# Patient Record
Sex: FEMALE | Race: WHITE | NOT HISPANIC OR LATINO | URBAN - METROPOLITAN AREA
[De-identification: names, ages, dates, MRNs, and addresses within clinical notes are randomized per-mention and may not be internally consistent; named-entity substitution may affect disease eponyms.]

---

## 2017-09-06 ENCOUNTER — OUTPATIENT (OUTPATIENT)
Dept: OUTPATIENT SERVICES | Facility: HOSPITAL | Age: 82
LOS: 1 days | End: 2017-09-06
Payer: MEDICARE

## 2017-09-06 ENCOUNTER — APPOINTMENT (OUTPATIENT)
Dept: ULTRASOUND IMAGING | Facility: HOSPITAL | Age: 82
End: 2017-09-06
Payer: MEDICARE

## 2017-09-06 PROBLEM — Z00.00 ENCOUNTER FOR PREVENTIVE HEALTH EXAMINATION: Status: ACTIVE | Noted: 2017-09-06

## 2017-09-06 PROCEDURE — 76770 US EXAM ABDO BACK WALL COMP: CPT | Mod: 26

## 2017-09-06 PROCEDURE — 76770 US EXAM ABDO BACK WALL COMP: CPT

## 2019-04-25 ENCOUNTER — APPOINTMENT (OUTPATIENT)
Dept: ORTHOPEDIC SURGERY | Facility: CLINIC | Age: 84
End: 2019-04-25
Payer: MEDICARE

## 2019-04-25 PROCEDURE — 99214 OFFICE O/P EST MOD 30 MIN: CPT | Mod: 25

## 2019-04-25 PROCEDURE — 20610 DRAIN/INJ JOINT/BURSA W/O US: CPT | Mod: LT

## 2019-04-25 NOTE — PHYSICAL EXAM
[Normal RLE] : Right Lower Extremity: No scars, rashes, lesions, ulcers, skin intact [Normal Touch] : sensation intact for touch [Normal LLE] : Left Lower Extremity: No scars, rashes, lesions, ulcers, skin intact [Normal] : No swelling, no edema, normal pedal pulses and normal temperature [Acute Distress] : not in acute distress [Poor Appearance] : well-appearing [Obese] : not obese [de-identified] : Left Lower Extremity\par o Knee :\par ¦ Inspection/Palpation : no tenderness to palpation along the joint lines, no swelling, no deformity \par ¦ Range of Motion : 0 - 110 degrees, no crepitus\par ¦ Stability : no valgus or varus instability present on provocative testing, Lachman’s Test (-)\par ¦ Strength : flexion and extension 3/5 (compared to 5/5 on the right)\par o Muscle Bulk : normal muscle bulk present\par o Skin : no erythema, no ecchymosis \par o Sensation : sensation to pin intact\par o Vascular Exam : no edema, no cyanosis, dorsalis pedis artery pulse 2+, posterior tibial artery pulse 2+  [de-identified] : o XRays of the left knee taken in the office on 12/3/2018, revealed:\par There are no soft tissue abnormalities, no fractures, alignment is normal, mild to moderate medial compartment osteoarthritis, normal bone density, no bony lesions.

## 2019-04-25 NOTE — END OF VISIT
[FreeTextEntry3] : All medical record entries made by the Nilsonibvioletta were at my, Dr. Raheem Benavidez, direction and personally dictated by me on 04/25/2019. I have reviewed the chart and agree that the record accurately reflects my personal performance of the history, physical exam, assessment and plan. I have also personally directed, reviewed, and agreed with the chart.

## 2019-04-25 NOTE — PROCEDURE
[de-identified] : At this point I recommended a therapeutic injection and under sterile precautions an injection of 4 cc of Monovisc (Lot: [id number], Expiration: 11/2021), was placed into the joint of the Left knee without complication.

## 2019-04-25 NOTE — ADDENDUM
[FreeTextEntry1] : I, Temi Kearns, acted solely as a scribe for Dr. Raheem Benavidez on this date 04/25/2019.

## 2019-04-25 NOTE — HISTORY OF PRESENT ILLNESS
[de-identified] : 83 year old female presents for an evaluation of chronic left knee pain, shes been diagnosed with mild osteoarthritis of her knee. She was being followed by Dr. Mckeon who treated her with corticosteroid injections to help alleviated her symptoms, her last injection was on 1/23/2018. Today she rates her pain a 5/10 and describes it as a dull aching pain located diffusely about her knee that is exacerbated with prolonged periods of walking. She is accompanied to the office by her  who is contributing to her medical history. She has no other complaints at this time.

## 2019-04-25 NOTE — DISCUSSION/SUMMARY
[de-identified] : The underlying pathophysiology was reviewed in great detail with the patient as well as the various treatment options, including ice, analgesics, NSAIDs, Physical therapy, steroid injections.\par \par The patient wishes to proceed with a MONOVISC injection of the left knee. \par \par A home exercise sheet was given and discussed with the patient to follow. \par \par FU PRN.

## 2019-11-21 ENCOUNTER — APPOINTMENT (OUTPATIENT)
Dept: ORTHOPEDIC SURGERY | Facility: CLINIC | Age: 84
End: 2019-11-21
Payer: MEDICARE

## 2019-11-21 VITALS
DIASTOLIC BLOOD PRESSURE: 83 MMHG | SYSTOLIC BLOOD PRESSURE: 135 MMHG | WEIGHT: 150 LBS | HEIGHT: 63 IN | HEART RATE: 66 BPM | BODY MASS INDEX: 26.58 KG/M2

## 2019-11-21 PROCEDURE — 20610 DRAIN/INJ JOINT/BURSA W/O US: CPT | Mod: LT

## 2019-11-21 PROCEDURE — 99213 OFFICE O/P EST LOW 20 MIN: CPT | Mod: 25

## 2019-11-21 NOTE — DISCUSSION/SUMMARY
[de-identified] : The underlying pathophysiology was reviewed in great detail with the patient as well as the various treatment options, including ice, analgesics, NSAIDs, Physical therapy, steroid injections.\par \par The patient wishes to proceed with a MONOVISC injection of the left knee. \par \par FU PRN.

## 2019-11-21 NOTE — PHYSICAL EXAM
[Normal RLE] : Right Lower Extremity: No scars, rashes, lesions, ulcers, skin intact [Normal LLE] : Left Lower Extremity: No scars, rashes, lesions, ulcers, skin intact [Normal Touch] : sensation intact for touch [Normal] : No swelling, no edema, normal pedal pulses and normal temperature [Poor Appearance] : well-appearing [Acute Distress] : not in acute distress [Obese] : not obese [de-identified] : Left Lower Extremity\par o Knee :\par ¦ Inspection/Palpation : no tenderness to palpation along the joint lines, no swelling, no deformity \par ¦ Range of Motion : 0 - 110 degrees, no crepitus\par ¦ Stability : no valgus or varus instability present on provocative testing, Lachman’s Test (-)\par ¦ Strength : flexion and extension 3/5 (compared to 5/5 on the right)\par o Muscle Bulk : normal muscle bulk present\par o Skin : no erythema, no ecchymosis \par o Sensation : sensation to pin intact\par o Vascular Exam : no edema, no cyanosis, dorsalis pedis artery pulse 2+, posterior tibial artery pulse 2+

## 2019-11-21 NOTE — END OF VISIT
[FreeTextEntry3] : All medical record entries made by the Scribe were at my, Dr. Raheem Benavidez, direction and personally dictated by me on 11/21/2019. I have reviewed the chart and agree that the record accurately reflects my personal performance of the history, physical exam, assessment and plan. I have also personally directed, reviewed, and agreed with the chart.

## 2019-11-21 NOTE — ADDENDUM
[FreeTextEntry1] : I, Lauren Sinha, acted solely as a scribe for Dr. Raheem Benavidez on this date 11/21/2019.

## 2019-11-21 NOTE — PROCEDURE
[de-identified] : At this point I recommended a therapeutic injection and under sterile precautions an injection of 4 cc of Monovisc (Lot: [id number], Expiration: 04/2022), was placed into the joint of the Left knee without complication.

## 2019-11-21 NOTE — HISTORY OF PRESENT ILLNESS
[de-identified] : 84 year old female presents for an evaluation of chronic left knee pain, she has been diagnosed with mild to moderate medial compartment osteoarthritis of her left knee. At her last visit on 4/25/2019 she received a Monovisc injection of the left knee which greatly alleviated her symptoms. Her pain has since returned and she describes a dull aching pain located diffusely about her knee that is exacerbated with prolonged periods of walking. She has been utilizing CBD oil to manage her pain at this time.

## 2020-04-24 ENCOUNTER — APPOINTMENT (OUTPATIENT)
Dept: ORTHOPEDIC SURGERY | Facility: CLINIC | Age: 85
End: 2020-04-24
Payer: MEDICARE

## 2020-04-24 PROCEDURE — 99213 OFFICE O/P EST LOW 20 MIN: CPT | Mod: 95

## 2020-04-24 NOTE — HISTORY OF PRESENT ILLNESS
[Other Location: e.g. Home (Enter Location, City,State)___] : at [unfilled] [Home] : at home, [unfilled] , at the time of the visit. [Self] : self [Patient] : the patient [Spouse] : spouse [de-identified] : 84 year old female presents for an evaluation of chronic left knee pain, she has been diagnosed with mild to moderate medial compartment osteoarthritis of her left knee. At her last visit on 4/25/2019 she received a Monovisc injection of the left knee which greatly alleviated her symptoms. Her pain has since returned and she describes a dull aching pain located diffusely about her knee that is exacerbated with prolonged periods of walking. She has been utilizing CBD oil to manage her pain at this time.  She states that the Monovisc injection was very helpful but that it has begun to wear off.

## 2020-04-24 NOTE — DISCUSSION/SUMMARY
[de-identified] : The underlying pathophysiology was reviewed in great detail with the patient as well as the various treatment options, including ice, analgesics, NSAIDs, Physical therapy, steroid injections.\par \par The patient wishes to proceed with a MONOVISC injection of the left knee. \par \par FU in one month when she becomes eligible for repeat injection.

## 2020-04-24 NOTE — PHYSICAL EXAM
[Normal LLE] : Left Lower Extremity: No scars, rashes, lesions, ulcers, skin intact [Normal Touch] : sensation intact for touch [Normal RLE] : Right Lower Extremity: No scars, rashes, lesions, ulcers, skin intact [Normal] : No swelling, no edema, normal pedal pulses and normal temperature [Poor Appearance] : well-appearing [Obese] : not obese [de-identified] : Well developed well nourished patient in no acute distress. \par Alert and oriented x 3.\par Normal mood and affect. \par Skin without rashes. \par Breathing non-labored. \par Normal balance, normal gait\par \par Left Lower Extremity\par o Knee :\par ¦ Inspection/Palpation : pt localizes tenderness to the medial knee, no swelling, no deformity \par ¦ Range of Motion : 0 - 110 degrees, no crepitus\par o Muscle Bulk : normal muscle bulk present\par o Skin : no erythema, no ecchymosis \par o Sensation : sensation intact\par o Vascular Exam : no edema, no cyanosis [Acute Distress] : not in acute distress

## 2020-05-18 ENCOUNTER — APPOINTMENT (OUTPATIENT)
Dept: ORTHOPEDIC SURGERY | Facility: CLINIC | Age: 85
End: 2020-05-18
Payer: MEDICARE

## 2020-05-18 DIAGNOSIS — S52.90XA UNSPECIFIED FRACTURE OF UNSPECIFIED FOREARM, INITIAL ENCOUNTER FOR CLOSED FRACTURE: ICD-10-CM

## 2020-05-18 DIAGNOSIS — S59.109G: ICD-10-CM

## 2020-05-18 PROCEDURE — 99214 OFFICE O/P EST MOD 30 MIN: CPT | Mod: 25

## 2020-05-18 PROCEDURE — 73110 X-RAY EXAM OF WRIST: CPT | Mod: LT

## 2020-05-18 PROCEDURE — 20605 DRAIN/INJ JOINT/BURSA W/O US: CPT | Mod: LT

## 2020-05-18 PROCEDURE — 29105 APPLICATION LONG ARM SPLINT: CPT | Mod: 59,LT

## 2020-05-18 NOTE — DISCUSSION/SUMMARY
[de-identified] : The underlying pathophysiology was reviewed in great detail with the patient as well as the various treatment options, including ice, analgesics, NSAIDs, Physical therapy, steroid injections, cast, splint and surgical intervention.\par \par Fracture was reduced and Sugar tong splint was applied today.\par \par I explained that in the current reduction she will have some persistent deformity of the wrist.  She does not want to pursue surgery at this time and is accepting of the deformity.\par \par FU in 1 week for repeat xrays and cast placement.

## 2020-05-18 NOTE — PHYSICAL EXAM
[de-identified] : Left Upper Extremity\par o Wrist:\par ¦ Inspection/Palpation :diffuse tenderness, moderate swelling, no gross deformities\par ¦ Range of Motion : limited and painful in all planes, no crepitus\par ¦ Strength : not assessed today due to pain\par ¦ Stability : no joint instability on provocative testing\par ¦ Tests/Signs : Tinel's sign (-) over carpal tunnel\par o Muscle Bulk : no atrophy\par o Sensation : sensation intact to light touch\par o Skin : no skin lesions or discoloration\par o Vascular Exam : no edema or cyanosis, radial and ulnar pulses normal [de-identified] : o Left Wrist: AP, lateral and oblique views of the wrist were obtained, there are no soft tissue abnormalities, transverse fracture of distal radius metaphysis with shortening and slight dorsal angulation, normal appearing joint spaces, normal bone density, no bony lesions.\par \par o Left Wrist Post Reduction: AP, lateral and oblique views of the wrist were obtained, there are no soft tissue abnormalities, transverse fracture of distal radius fracture with continued shortening and improved dorsal angulation, alignment is normal, normal appearing joint spaces, normal bone density, no bony lesions.\par \par \par \par \par

## 2020-05-18 NOTE — HISTORY OF PRESENT ILLNESS
[de-identified] : MARCE LÓPEZ is a 84 year female presenting to the office complaining of left wrist pain . She  presents to the office immobilized in a soft brace. Patient reports pain since yesterday when she fell out of bed landing on her left wrist. The patient describes the pain as a dull aching, and occasionally sharp pain diffusely located in her wrist that is intermittent in nature. She reports some swelling and bruising of the wrist. Her  symptoms are exacerbated with any movement of the wrist or hand. Patient reports the pain did not wake her up last night. Patient is taking NSAIDs for pain relief with good relief in symptoms. Patient denies any other complaints at this time.\par

## 2020-05-18 NOTE — PROCEDURE
[de-identified] : ¦ Local anesthetic of 8 cc of 1% Lidocaine was injected to the ventral aspect of the left wrist. \par ¦ Distal radius fracture was reduced and placed in a sugar tong splint. \par ¦ Neurovascular status was assessed pre and postreduction and placement of the sugar tong splint. She was neurovascularly intact.

## 2020-05-28 ENCOUNTER — APPOINTMENT (OUTPATIENT)
Dept: ORTHOPEDIC SURGERY | Facility: CLINIC | Age: 85
End: 2020-05-28
Payer: MEDICARE

## 2020-05-28 PROCEDURE — 73110 X-RAY EXAM OF WRIST: CPT | Mod: LT

## 2020-05-28 PROCEDURE — 29075 APPL CST ELBW FNGR SHORT ARM: CPT | Mod: LT

## 2020-05-28 PROCEDURE — 99214 OFFICE O/P EST MOD 30 MIN: CPT | Mod: 25

## 2020-05-28 NOTE — PROCEDURE
[de-identified] : b Short arm cast placement left arm. \par ¦ Distal radius fracture was placed in a short arm cast\par ¦ Neurovascular status was assessed pre and postreduction and placement of the short arm cast. She was neurovascularly intact.

## 2020-05-28 NOTE — PHYSICAL EXAM
[de-identified] : Left Upper Extremity\par o Wrist:\par ¦ Inspection/Palpation : splint in place which was removed, diffuse tenderness, moderate swelling, mild radial deviation deformity\par ¦ Range of Motion : limited and painful in all planes, no crepitus\par ¦ Strength : not assessed today due to pain\par ¦ Stability : no joint instability on provocative testing\par ¦ Tests/Signs : Tinel's sign (-) over carpal tunnel\par o Muscle Bulk : no atrophy\par o Sensation : sensation intact to light touch\par o Skin : no skin lesions or discoloration\par o Vascular Exam : no edema or cyanosis, radial and ulnar pulses normal [de-identified] : o Left Wrist: AP, lateral and oblique views of the wrist were obtained, there are no soft tissue abnormalities, transverse fracture of distal radius metaphysis with shortening and loss of radial inclination, positive ulnar variance, normal saggital alignment, normal appearing joint spaces, normal bone density, no bony lesions.\par \par \par \par \par \par

## 2020-05-28 NOTE — DISCUSSION/SUMMARY
[de-identified] : The underlying pathophysiology was reviewed in great detail with the patient as well as the various treatment options, including ice, analgesics, NSAIDs, Physical therapy, steroid injections, cast, splint and surgical intervention.\par \par A short arm cast was applied today.\par \par I explained that in the current reduction she will have some persistent deformity of the wrist.  She does not want to pursue surgery at this time and is accepting of the deformity. I use models to aid in my explanation.\par \par FU in 2 week for repeat xray

## 2020-05-28 NOTE — HISTORY OF PRESENT ILLNESS
[de-identified] : MARCE LÓPEZ is a 84 year female presenting to the office complaining of left wrist pain. She  presents to the office immobilized in a soft brace. Patient reports pain began on 05/18/2020 when she fell out of bed landing on her left wrist. The patient describes the pain as a dull aching, and occasionally sharp pain diffusely located in her wrist that is intermittent in nature. She reports some swelling and bruising of the wrist. Her symptoms are exacerbated with any movement of the wrist or hand. Patient reports the pain does not wake her up at night. Patient is taking NSAIDs for pain relief with good relief in symptoms. She presents today for repeat xrays and cast placement. Patient denies any other complaints at this time.\par

## 2020-06-08 ENCOUNTER — APPOINTMENT (OUTPATIENT)
Dept: ORTHOPEDIC SURGERY | Facility: CLINIC | Age: 85
End: 2020-06-08
Payer: MEDICARE

## 2020-06-08 PROCEDURE — 20610 DRAIN/INJ JOINT/BURSA W/O US: CPT | Mod: LT

## 2020-06-08 PROCEDURE — 73110 X-RAY EXAM OF WRIST: CPT | Mod: LT

## 2020-06-08 PROCEDURE — 99214 OFFICE O/P EST MOD 30 MIN: CPT | Mod: 25

## 2020-06-08 NOTE — DISCUSSION/SUMMARY
[de-identified] : The underlying pathophysiology was reviewed in great detail with the patient as well as the various treatment options, including ice, analgesics, NSAIDs, Physical therapy, steroid injections, cast, splint and surgical intervention.\par \par Patient received a Durolane injection of the left knee today. \par \par She may return in 6 months for another series of gel injections as per insurance guidelines. A cortisone injection may be administered during the interim period if she cannot tolerate such a wait.\par \par Remain in cast for 3 more weeks. \par \par I explained that in the current reduction she will have some persistent deformity of the wrist.  She does not want to pursue surgery at this time and is accepting of the deformity. I use models to aid in my explanation.\par \par FU in 3 week for repeat xray and cast removal.\par \par

## 2020-06-08 NOTE — PROCEDURE
[de-identified] : At this point I recommended a therapeutic injection and under sterile precautions an injection of 3 cc of Durolane (Lot: [id number] , Expiration: 11/20222 ), was placed into the joint of the Left knee without complication.\par \par \par

## 2020-06-08 NOTE — HISTORY OF PRESENT ILLNESS
[de-identified] : MARCE LÓPEZ is a 84 year female presenting to the office complaining of left wrist pain. She  presents to the office immobilized in a cast. Patient reports pain began on 05/18/2020 when she fell out of bed landing on her left wrist. The patient describes the pain as a dull aching, and occasionally sharp pain diffusely located in her wrist that is intermittent in nature. Patient is taking NSAIDs for pain relief with good relief in symptoms. She presents today for repeat xrays. \par \par She also presents complaining of chronic left knee pain, she has been diagnosed with mild to moderate medial compartment osteoarthritis of her left knee. At her last visit on 11/21/2019 she received a Monovisc injection of the left knee which greatly alleviated her symptoms. Her pain has since returned and she describes a dull aching pain located diffusely about her knee that is exacerbated with prolonged periods of walking. She has been utilizing CBD oil to manage her pain at this time. She states that the Monovisc injection was very helpful but that it has begun to wear off. She would like to proceed with another gel injection today. \par Patient denies any other complaints at this time.

## 2020-06-08 NOTE — PHYSICAL EXAM
[de-identified] : Left Upper Extremity\par o Wrist:\par ¦ Inspection/Palpation : cast in place, clean, dry and intact, not assessed due to cast placement. \par ¦ Range of Motion : not assessed due to cast placement. \par ¦ Strength : not assessed today due to pain\par ¦ Stability : no joint instability on provocative testing\par ¦ Tests/Signs : not assessed due to cast placement. \par o Muscle Bulk : no atrophy\par o Sensation : not assessed due to cast placement. \par o Vascular Exam :not assessed due to cast placement. \par \par Left Lower Extremity\par o Knee :\par ¦ Inspection/Palpation : no tenderness to palpation along the joint lines, no swelling, no deformity \par ¦ Range of Motion : 0 - 110 degrees, no crepitus\par ¦ Stability : no valgus or varus instability present on provocative testing, Lachman’s Test (-)\par ¦ Strength : flexion and extension 3/5 (compared to 5/5 on the right)\par o Muscle Bulk : normal muscle bulk present\par o Skin : no erythema, no ecchymosis \par o Sensation : sensation to pin intact\par o Vascular Exam : no edema, no cyanosis, dorsalis pedis artery pulse 2+, posterior tibial artery pulse 2+ \par Musculoskeletal: Gait: normal. \par  [de-identified] : o Left Wrist: AP, lateral and oblique views of the wrist were obtained, there are no soft tissue abnormalities, transverse fracture of distal radius metaphysis with shortening and loss of radial inclination, positive ulnar variance, normal sagittal alignment, callus formation present, normal appearing joint spaces, normal bone density, no bony lesions.\par \par \par \par \par \par

## 2020-06-29 ENCOUNTER — APPOINTMENT (OUTPATIENT)
Dept: ORTHOPEDIC SURGERY | Facility: CLINIC | Age: 85
End: 2020-06-29
Payer: MEDICARE

## 2020-06-29 VITALS — WEIGHT: 150 LBS | HEIGHT: 63 IN | BODY MASS INDEX: 26.58 KG/M2

## 2020-06-29 PROCEDURE — 99213 OFFICE O/P EST LOW 20 MIN: CPT

## 2020-06-29 PROCEDURE — 73110 X-RAY EXAM OF WRIST: CPT | Mod: LT

## 2020-06-29 NOTE — HISTORY OF PRESENT ILLNESS
[de-identified] : MARCE LÓPEZ is a 84 year female presenting to the office complaining of left wrist pain. She  presents to the office immobilized in a cast. Patient reports pain began on 05/18/2020 when she fell out of bed landing on her left wrist. The patient describes the pain as a dull aching, and occasionally sharp pain diffusely located in her wrist that is intermittent in nature. Patient is taking NSAIDs for pain relief with good relief in symptoms. She presents today for repeat xrays. \par \par She also presents complaining of chronic left knee pain, she has been diagnosed with mild to moderate medial compartment osteoarthritis of her left knee. At her last visit on 11/21/2019 she received a Monovisc injection of the left knee which greatly alleviated her symptoms. Her pain has since returned and she describes a dull aching pain located diffusely about her knee that is exacerbated with prolonged periods of walking. She has been utilizing CBD oil to manage her pain at this time. She states that the Monovisc injection was very helpful but that it has begun to wear off. She would like to proceed with another gel injection today. \par Patient denies any other complaints at this time.

## 2020-06-29 NOTE — DISCUSSION/SUMMARY
[de-identified] : The underlying pathophysiology was reviewed in great detail with the patient as well as the various treatment options, including ice, analgesics, NSAIDs, Physical therapy, steroid injections, cast, splint and surgical intervention.\par \par Cast was removed and patient was placed in a wrist brace today. Continue use as needed for pain control and support. \par \par A home exercise sheet was given and discussed with the patient to follow.\par \par I explained that in the current reduction she will have some persistent deformity of the wrist.  She does not want to pursue surgery at this time and is accepting of the deformity. I use models to aid in my explanation.\par \par FU in 4 week for repeat xray \par \par

## 2020-06-29 NOTE — PHYSICAL EXAM
[de-identified] : Left Upper Extremity\par o Wrist:\par ¦ Inspection/Palpation : distal radius tenderness to palpation. Mild diffuse swelling. No deformities.\par ¦ Range of Motion : moderately restricted motion in all planes with mild pain.\par ¦ Strength : not assessed today due to pain\par ¦ Stability : no joint instability on provocative testing\par ¦ Tests/Signs : not assessed due to cast placement. \par o Muscle Bulk : no atrophy\par o Sensation : not assessed due to cast placement. \par o Vascular Exam :not assessed due to cast placement. \par \par Left Lower Extremity\par o Knee :\par ¦ Inspection/Palpation : no tenderness to palpation along the joint lines, no swelling, no deformity \par ¦ Range of Motion : 0 - 110 degrees, no crepitus\par ¦ Stability : no valgus or varus instability present on provocative testing, Lachman’s Test (-)\par ¦ Strength : flexion and extension 3/5 (compared to 5/5 on the right)\par o Muscle Bulk : normal muscle bulk present\par o Skin : no erythema, no ecchymosis \par o Sensation : sensation to pin intact\par o Vascular Exam : no edema, no cyanosis, dorsalis pedis artery pulse 2+, posterior tibial artery pulse 2+ \par Musculoskeletal: Gait: normal. \par  [de-identified] : o Left Wrist: AP, lateral and oblique views of the wrist were obtained, there are no soft tissue abnormalities, transverse fracture of distal radius metaphysis with shortening and loss of radial inclination, positive ulnar variance, normal sagittal alignment, callus formation present, normal appearing joint spaces, normal bone density, no bony lesions.\par \par \par \par \par \par

## 2020-07-30 ENCOUNTER — APPOINTMENT (OUTPATIENT)
Dept: ORTHOPEDIC SURGERY | Facility: CLINIC | Age: 85
End: 2020-07-30
Payer: MEDICARE

## 2020-07-30 DIAGNOSIS — S52.532D COLLES' FRACTURE OF LEFT RADIUS, SUBSEQUENT ENCOUNTER FOR CLOSED FRACTURE WITH ROUTINE HEALING: ICD-10-CM

## 2020-07-30 PROCEDURE — 73110 X-RAY EXAM OF WRIST: CPT | Mod: LT

## 2020-07-30 PROCEDURE — 99213 OFFICE O/P EST LOW 20 MIN: CPT

## 2020-07-30 NOTE — HISTORY OF PRESENT ILLNESS
[de-identified] : MARCE LÓPEZ is a 84 year female presenting to the office complaining of left wrist pain. She  presents to the office wearing a splint as needed. Patient reports pain began on 05/18/2020 when she fell out of bed landing on her left wrist. The patient describes the pain as a dull aching, and occasionally sharp pain diffusely located in her wrist that is intermittent in nature. She has been performing a home exercise program noting improvements in strength and range of motion. Patient is taking NSAIDs for pain relief with good relief in symptoms. She presents today for repeat xrays. \par \par She also presents complaining of chronic left knee pain, she has been diagnosed with mild to moderate medial compartment osteoarthritis of her left knee. At her last visit on 06/08/2020 she received a Durolane injection of the left knee which greatly alleviated her symptoms. Her pain has since minimally returned and she describes a dull aching pain located diffusely about her knee that is exacerbated with prolonged periods of walking. She has been utilizing CBD oil to manage her pain at this time. \par Patient denies any other complaints at this time.

## 2020-07-30 NOTE — PHYSICAL EXAM
[de-identified] : Left Upper Extremity\par o Wrist:\par ¦ Inspection/Palpation : no distal radius tenderness to palpation. minimal diffuse swelling. radial shortening deformity.\par ¦ Range of Motion : flexion restricted at 45 degrees, full extension of the wrist\par ¦ Strength :Flexion/Extension 5/5\par ¦ Stability : no joint instability on provocative testing\par ¦ Tests/Signs : not assessed due to cast placement. \par o Muscle Bulk : no atrophy\par o Sensation : not assessed due to cast placement. \par o Vascular Exam :not assessed due to cast placement. \par \par Left Lower Extremity\par o Knee :\par ¦ Inspection/Palpation : no tenderness to palpation along the joint lines, no swelling, no deformity \par ¦ Range of Motion : 0 - 110 degrees, no crepitus\par ¦ Stability : no valgus or varus instability present on provocative testing, Lachman’s Test (-)\par ¦ Strength : flexion and extension 3/5 (compared to 5/5 on the right)\par o Muscle Bulk : normal muscle bulk present\par o Skin : no erythema, no ecchymosis \par o Sensation : sensation to pin intact\par o Vascular Exam : no edema, no cyanosis, dorsalis pedis artery pulse 2+, posterior tibial artery pulse 2+ \par Musculoskeletal: Gait: normal. \par  [de-identified] : o Left Wrist: AP, lateral and oblique views of the wrist were obtained, there are no soft tissue abnormalities, transverse fracture of distal radius metaphysis with shortening and loss of radial inclination, positive ulnar variance, normal sagittal alignment, increased callus formation present, normal appearing joint spaces, normal bone density, no bony lesions.\par \par \par \par \par \par

## 2020-07-30 NOTE — DISCUSSION/SUMMARY
[de-identified] : The underlying pathophysiology was reviewed in great detail with the patient as well as the various treatment options, including ice, analgesics, NSAIDs, Physical therapy, steroid injections, cast, splint and surgical intervention.\par \par  Continue use of wrist brace as needed for pain control and support. \par \par Continue home exercise program \par \par I explained that in the current reduction she will have some persistent deformity of the wrist.  She does not want to pursue surgery at this time and is accepting of the deformity. I use models to aid in my explanation.\par \par FU 8 weeks or prn for the wrist. \par \par

## 2020-08-24 ENCOUNTER — APPOINTMENT (OUTPATIENT)
Dept: ORTHOPEDIC SURGERY | Facility: CLINIC | Age: 85
End: 2020-08-24
Payer: MEDICARE

## 2020-08-24 PROCEDURE — 20610 DRAIN/INJ JOINT/BURSA W/O US: CPT | Mod: RT

## 2020-08-24 PROCEDURE — 99214 OFFICE O/P EST MOD 30 MIN: CPT | Mod: 25

## 2020-08-24 PROCEDURE — 73564 X-RAY EXAM KNEE 4 OR MORE: CPT | Mod: RT

## 2020-08-24 NOTE — PHYSICAL EXAM
[Normal RLE] : Right Lower Extremity: No scars, rashes, lesions, ulcers, skin intact [Normal LLE] : Left Lower Extremity: No scars, rashes, lesions, ulcers, skin intact [Normal Touch] : sensation intact for touch [Normal] : No swelling, no edema, normal pedal pulses and normal temperature [Poor Appearance] : well-appearing [Acute Distress] : not in acute distress [Obese] : not obese [de-identified] : Well developed well nourished patient in no acute distress. \par Alert and oriented x 3.\par Normal mood and affect. \par Skin without rashes. \par Breathing non-labored. \par Normal balance, normal gait\par \par Right Lower Extremity\par o Knee :\par ¦ Inspection/Palpation : medial joint line tenderness to palpation, no swelling, no deformity\par ¦ Range of Motion : 0 - 110 degrees, no crepitus\par ¦ Stability : no valgus or varus instability present on provocative testing, Lachman’s Test (-)\par ¦ Strength : flexion and extension 4/5\par o Muscle Bulk : normal muscle bulk present\par o Skin : no erythema, no ecchymosis\par o Sensation : sensation to pin intact\par o Vascular Exam : no edema, no cyanosis, dorsalis pedis artery pulse 2+, posterior tibial artery pulse 2+\par \par Left Lower Extremity\par o Knee :\par ¦ Inspection/Palpation : no tenderness to palpation, no swelling, no deformity\par ¦ Range of Motion : 0 -110 degrees, no crepitus\par ¦ Stability : no valgus or varus instability present on provocative testing, Lachman’s Test (-)\par ¦ Strength : flexion and extension 4/5\par o Muscle Bulk : normal muscle bulk present\par o Skin : no erythema, no ecchymosis\par o Sensation : sensation to pin intact\par o Vascular Exam : no edema, no cyanosis, dorsalis pedis artery pulse 2+, posterior tibial artery pulse 2+\par  [de-identified] : o Right Knee : AP, lateral, sunrise, and Ha views of the knee were obtained, there are no soft tissue abnormalities, no fractures, alignment is normal, severe tricompartmental osteoarthritis with bone on bone apposition of the medial compartment, normal bone density, no bony lesions.\par \par  \par \par \par

## 2020-08-24 NOTE — PROCEDURE
[de-identified] : At this point I recommended a therapeutic injection and under sterile precautions an injection of 4 cc of Monovisc (Lot: [id number] , Expiration: 10/22), was placed into the joint of the Right knee without complication\par \par  [FreeTextEntry1] : \par

## 2020-08-24 NOTE — DISCUSSION/SUMMARY
[de-identified] : The underlying pathophysiology was reviewed in great detail with the patient as well as the various treatment options, including ice, analgesics, NSAIDs, Physical therapy, steroid injections, hyaluronic gel injections, TKR \par \par The patient wishes to proceed with a MONOVISC injection of the right knee. \par \par Activity modifications and restrictions were discussed. I advised avoiding deep bending, squatting and high intensity activity. \par \par She may return in 6 months for another series of gel injections as per insurance guidelines. A cortisone injection may be administered during the interim period if she cannot tolerate such a wait.

## 2020-08-24 NOTE — HISTORY OF PRESENT ILLNESS
[de-identified] : 85 year old female presents for an evaluation of bilateral knee pain. She has been diagnosed with mild to moderate medial compartment osteoarthritis of her left knee. At her last visit on 06/08/2020 she received a Durolane injection of the left knee which greatly alleviated her symptoms. Her pain has since returned and she describes a dull aching pain located diffusely about her bilateral knees that is exacerbated with prolonged periods of walking. She has been utilizing CBD oil to manage her pain at this time.  She states that the Durolane injection for the left knee has greatly alleviated her symptoms. Her right knee pain has been greatly increasing over the past one month.

## 2021-05-13 ENCOUNTER — APPOINTMENT (OUTPATIENT)
Dept: ORTHOPEDIC SURGERY | Facility: CLINIC | Age: 86
End: 2021-05-13
Payer: MEDICARE

## 2021-05-13 VITALS — BODY MASS INDEX: 24.8 KG/M2 | WEIGHT: 140 LBS | HEIGHT: 63 IN

## 2021-05-13 PROCEDURE — 99213 OFFICE O/P EST LOW 20 MIN: CPT | Mod: 25

## 2021-05-13 PROCEDURE — 20610 DRAIN/INJ JOINT/BURSA W/O US: CPT | Mod: 50

## 2021-05-13 NOTE — PROCEDURE
[de-identified] : At this point I recommended a therapeutic injection and under sterile precautions an injection of 4 cc of Monovisc (Lot: [id number] , Expiration: 11/22), was placed into the joint of the Right knee without complication\par \par At this point I recommended a therapeutic injection and under sterile precautions an injection of 4 cc of Monovisc (Lot: [id number] , Expiration: 11/2023), was placed into the joint of the Left  knee without complication\par \par  [FreeTextEntry1] : \par

## 2021-05-13 NOTE — REASON FOR VISIT
[Normal] : well developed, well nourished, in no acute distress [Follow-Up Visit] : a follow-up visit for [Knee Pain] : knee pain [Other: ____] : [unfilled]

## 2021-05-13 NOTE — DISCUSSION/SUMMARY
[de-identified] : The underlying pathophysiology was reviewed in great detail with the patient as well as the various treatment options, including ice, analgesics, NSAIDs, Physical therapy, steroid injections, hyaluronic gel injections, TKR \par \par The patient wishes to proceed with a MONOVISC injection of the right and left knee. \par \par Activity modifications and restrictions were discussed. I advised avoiding deep bending, squatting and high intensity activity. \par \par Continue home exercise program. \par \par She may return in 6 months for another series of gel injections as per insurance guidelines. A cortisone injection may be administered during the interim period if she cannot tolerate such a wait.

## 2021-05-13 NOTE — PHYSICAL EXAM
[Normal LLE] : Left Lower Extremity: No scars, rashes, lesions, ulcers, skin intact [Normal RLE] : Right Lower Extremity: No scars, rashes, lesions, ulcers, skin intact [Normal Touch] : sensation intact for touch [Normal] : No swelling, no edema, normal pedal pulses and normal temperature [Poor Appearance] : well-appearing [Acute Distress] : not in acute distress [Obese] : not obese [de-identified] : \par \par Right Lower Extremity\par o Knee :\par ¦ Inspection/Palpation : medial joint line tenderness to palpation, no swelling, no deformity\par ¦ Range of Motion : 0 - 110 degrees, no crepitus\par ¦ Stability : no valgus or varus instability present on provocative testing, Lachman’s Test (-)\par ¦ Strength : flexion and extension 4/5\par o Muscle Bulk : normal muscle bulk present\par o Skin : no erythema, no ecchymosis\par o Sensation : sensation to pin intact\par o Vascular Exam : no edema, no cyanosis, dorsalis pedis artery pulse 2+, posterior tibial artery pulse 2+\par \par Left Lower Extremity\par o Knee :\par ¦ Inspection/Palpation : no tenderness to palpation, no swelling, no deformity\par ¦ Range of Motion : 0 -110 degrees, no crepitus\par ¦ Stability : no valgus or varus instability present on provocative testing, Lachman’s Test (-)\par ¦ Strength : flexion and extension 4/5\par o Muscle Bulk : normal muscle bulk present\par o Skin : no erythema, no ecchymosis\par o Sensation : sensation to pin intact\par o Vascular Exam : no edema, no cyanosis, dorsalis pedis artery pulse 2+, posterior tibial artery pulse 2+\par

## 2021-05-13 NOTE — HISTORY OF PRESENT ILLNESS
[de-identified] : 85 year old female presents for an evaluation of bilateral knee pain. She has been diagnosed with mild to moderate medial compartment osteoarthritis of her left knee. At her last visit on 08/24/2020 she Recieved a Monovisc injection of the left knee, on  06/08/2020 she received a Durolane injection of the left knee which greatly alleviated her symptoms. Her pain has since returned and she describes a dull aching pain located diffusely about her bilateral knees that is exacerbated with prolonged periods of walking. She has been utilizing CBD oil to manage her pain at this time. She would like to proceed with bilateral knee gel injections today.

## 2021-10-25 ENCOUNTER — APPOINTMENT (OUTPATIENT)
Dept: ORTHOPEDIC SURGERY | Facility: CLINIC | Age: 86
End: 2021-10-25
Payer: MEDICARE

## 2021-10-25 PROCEDURE — 20610 DRAIN/INJ JOINT/BURSA W/O US: CPT | Mod: LT

## 2021-10-25 PROCEDURE — 99213 OFFICE O/P EST LOW 20 MIN: CPT | Mod: 25

## 2021-10-26 ENCOUNTER — RESULT REVIEW (OUTPATIENT)
Age: 86
End: 2021-10-26

## 2021-10-26 NOTE — DISCUSSION/SUMMARY
[de-identified] : The underlying pathophysiology was reviewed in great detail with the patient as well as the various treatment options, including ice, analgesics, NSAIDs, Physical therapy, steroid injections, hyaluronic gel injections, TKR \par \par Patient received a left knee corticosteroid injection today. \par \par Activity modifications and restrictions were discussed. I advised avoiding deep bending, squatting and high intensity activity. \par \par Continue home exercise program. \par \par FU on 11/15/2021 for bilateral knee Monovisc injections \par \par All questions were answered, all alternatives discussed and the patient is in complete agreement with that plan. Follow-up appointment as instructed. Any issues and the patient will call or come in sooner.

## 2021-10-26 NOTE — PHYSICAL EXAM
[Normal RLE] : Right Lower Extremity: No scars, rashes, lesions, ulcers, skin intact [Normal LLE] : Left Lower Extremity: No scars, rashes, lesions, ulcers, skin intact [Normal Touch] : sensation intact for touch [Normal] : No swelling, no edema, normal pedal pulses and normal temperature [Poor Appearance] : well-appearing [Acute Distress] : not in acute distress [Obese] : not obese [de-identified] : \par Right Lower Extremity\par o Knee :\par ¦ Inspection/Palpation : medial joint line tenderness to palpation, no swelling, no deformity\par ¦ Range of Motion : 0 - 110 degrees, no crepitus\par ¦ Stability : no valgus or varus instability present on provocative testing, Lachman’s Test (-)\par ¦ Strength : flexion and extension 4/5\par o Muscle Bulk : normal muscle bulk present\par o Skin : no erythema, no ecchymosis\par o Sensation : sensation to pin intact\par o Vascular Exam : no edema, no cyanosis, dorsalis pedis artery pulse 2+, posterior tibial artery pulse 2+\par \par Left Lower Extremity\par o Knee :\par ¦ Inspection/Palpation : medial joint line tenderness to palpation, no swelling, no deformity\par ¦ Range of Motion : 0 -110 degrees, no crepitus\par ¦ Stability : no valgus or varus instability present on provocative testing, Lachman’s Test (-)\par ¦ Strength : flexion and extension 4/5\par o Muscle Bulk : normal muscle bulk present\par o Skin : no erythema, no ecchymosis\par o Sensation : sensation to pin intact\par o Vascular Exam : no edema, no cyanosis, dorsalis pedis artery pulse 2+, posterior tibial artery pulse 2+\par

## 2021-10-26 NOTE — HISTORY OF PRESENT ILLNESS
[de-identified] : 85 year old female presents for an evaluation of bilateral knee pain. She has been diagnosed with mild to moderate medial compartment osteoarthritis of her left knee. At her last visit on 5/13/2021 patient received Bilateral Monovisc injections which greatly alleviated her symptoms. She notes her left knee pain  has since returned and she describes a dull aching pain located diffusely about her bilateral knees that is exacerbated with prolonged periods of walking. She notes increased posterior left knee pain and fullness at times. She notes it also feels swollen but does not today. She has been utilizing CBD oil to manage her pain at this time.

## 2021-10-26 NOTE — PROCEDURE
[de-identified] : At this point I recommended a therapeutic injection and under sterile precautions an injection of 4 cc 1% lidocaine with 0.5 cc of Kenalog and 0.5 cc of Dexamethasone - was placed into the joint of the Left knee without complication, and after several minutes, the patient felt significant relief.\par \par   [FreeTextEntry1] : \par

## 2021-11-15 ENCOUNTER — APPOINTMENT (OUTPATIENT)
Dept: ORTHOPEDIC SURGERY | Facility: CLINIC | Age: 86
End: 2021-11-15
Payer: MEDICARE

## 2021-11-15 PROCEDURE — 20610 DRAIN/INJ JOINT/BURSA W/O US: CPT | Mod: LT

## 2021-11-15 PROCEDURE — 99213 OFFICE O/P EST LOW 20 MIN: CPT | Mod: 25

## 2021-11-15 NOTE — DISCUSSION/SUMMARY
[de-identified] : The underlying pathophysiology was reviewed in great detail with the patient as well as the various treatment options, including ice, analgesics, NSAIDs, Physical therapy, steroid injections, hyaluronic gel injections, TKR \par \par Patient received a left knee corticosteroid injection today. \par \par Activity modifications and restrictions were discussed. I advised avoiding deep bending, squatting and high intensity activity. \par \par Continue home exercise program. \par \par FU on 11/15/2021 for bilateral knee Monovisc injections \par \par All questions were answered, all alternatives discussed and the patient is in complete agreement with that plan. Follow-up appointment as instructed. Any issues and the patient will call or come in sooner.

## 2021-11-15 NOTE — PHYSICAL EXAM
[Normal RLE] : Right Lower Extremity: No scars, rashes, lesions, ulcers, skin intact [Normal Touch] : sensation intact for touch [Normal LLE] : Left Lower Extremity: No scars, rashes, lesions, ulcers, skin intact [Normal] : No swelling, no edema, normal pedal pulses and normal temperature [Poor Appearance] : well-appearing [Acute Distress] : not in acute distress [Obese] : not obese [de-identified] : \par Right Lower Extremity\par o Knee :\par ¦ Inspection/Palpation : medial joint line tenderness to palpation, no swelling, no deformity\par ¦ Range of Motion : 0 - 110 degrees, no crepitus\par ¦ Stability : no valgus or varus instability present on provocative testing, Lachman’s Test (-)\par ¦ Strength : flexion and extension 4/5\par o Muscle Bulk : normal muscle bulk present\par o Skin : no erythema, no ecchymosis\par o Sensation : sensation to pin intact\par o Vascular Exam : no edema, no cyanosis, dorsalis pedis artery pulse 2+, posterior tibial artery pulse 2+\par \par Left Lower Extremity\par o Knee :\par ¦ Inspection/Palpation : medial joint line tenderness to palpation, no swelling, no deformity\par ¦ Range of Motion : 0 -110 degrees, no crepitus\par ¦ Stability : no valgus or varus instability present on provocative testing, Lachman’s Test (-)\par ¦ Strength : flexion and extension 4/5\par o Muscle Bulk : normal muscle bulk present\par o Skin : no erythema, no ecchymosis\par o Sensation : sensation to pin intact\par o Vascular Exam : no edema, no cyanosis, dorsalis pedis artery pulse 2+, posterior tibial artery pulse 2+\par

## 2021-11-15 NOTE — PROCEDURE
[de-identified] : At this point I recommended a therapeutic injection and under sterile precautions an injection of 4 cc 1% lidocaine with 0.5 cc of Kenalog and 0.5 cc of Dexamethasone - was placed into the joint of the Left knee without complication, and after several minutes, the patient felt significant relief.\par \par   [FreeTextEntry1] : \par

## 2021-11-15 NOTE — HISTORY OF PRESENT ILLNESS
[de-identified] : 85 year old female presents for an evaluation of bilateral knee pain. She has been diagnosed with mild to moderate medial compartment osteoarthritis of her left knee. At her last visit on 5/13/2021 patient received Bilateral Monovisc injections which greatly alleviated her symptoms. She notes her left knee pain  has since returned and she describes a dull aching pain located diffusely about her bilateral knees that is exacerbated with prolonged periods of walking. She notes increased posterior left knee pain and fullness at times. She notes it also feels swollen but does not today. She has been utilizing CBD oil to manage her pain at this time.

## 2022-05-10 ENCOUNTER — APPOINTMENT (OUTPATIENT)
Dept: ORTHOPEDIC SURGERY | Facility: CLINIC | Age: 87
End: 2022-05-10
Payer: MEDICARE

## 2022-05-10 PROCEDURE — 20610 DRAIN/INJ JOINT/BURSA W/O US: CPT | Mod: LT

## 2022-05-10 PROCEDURE — 99213 OFFICE O/P EST LOW 20 MIN: CPT | Mod: 25

## 2022-05-10 NOTE — REASON FOR VISIT
[Follow-Up Visit] : a follow-up visit for [Knee Pain] : knee pain [FreeTextEntry2] : Bilateral Knee Pain

## 2022-05-10 NOTE — DISCUSSION/SUMMARY
[de-identified] : The underlying pathophysiology was reviewed in great detail with the patient as well as the various treatment options, including ice, analgesics, NSAIDs, Physical therapy, steroid injections, hyaluronic gel injections, TKR \par \par Patient received a left knee corticosteroid injection today. \par \par Activity modifications and restrictions were discussed. I advised avoiding deep bending, squatting and high intensity activity. \par \par Continue home exercise program. \par \par FU next month for bilateral knee Monovisc injections \par \par All questions were answered, all alternatives discussed and the patient is in complete agreement with that plan. Follow-up appointment as instructed. Any issues and the patient will call or come in sooner.

## 2022-05-10 NOTE — HISTORY OF PRESENT ILLNESS
[de-identified] : Patient is an 85 year-old female who presents for re-evaluation of bilateral knee pain. Left knee is bothering her more today. She has been diagnosed with mild to moderate medial compartment osteoarthritis of her left knee. She received a cortisone injection back at her last visit in November that was effective in alleviating her symptoms until recently. She notes her left knee pain has since returned and she describes a dull aching pain located diffusely about her bilateral knees that is exacerbated with prolonged periods of walking. She notes increased posterior left knee pain and fullness at times. She notes it also feels swollen. She has been utilizing CBD oil to manage her pain at this time. The patient is requesting another cortisone injection today.

## 2022-05-10 NOTE — PROCEDURE
[de-identified] : At this point I recommended a therapeutic injection and under sterile precautions an injection of 0.5 cc Kenalog, 0.5 cc Dexamethasone and 4 cc 1% lidocaine were injected into the left knee without complication.\par

## 2022-05-10 NOTE — PHYSICAL EXAM
[Normal RLE] : Right Lower Extremity: No scars, rashes, lesions, ulcers, skin intact [Normal LLE] : Left Lower Extremity: No scars, rashes, lesions, ulcers, skin intact [Normal Touch] : sensation intact for touch [Normal] : No swelling, no edema, normal pedal pulses and normal temperature [Poor Appearance] : well-appearing [Acute Distress] : not in acute distress [Obese] : not obese [de-identified] : \par Right Lower Extremity\par o Knee :\par ¦ Inspection/Palpation : medial joint line tenderness to palpation, no swelling, no deformity\par ¦ Range of Motion : 0 - 110 degrees, no crepitus\par ¦ Stability : no valgus or varus instability present on provocative testing, Lachman’s Test (-)\par ¦ Strength : flexion and extension 4/5\par o Muscle Bulk : normal muscle bulk present\par o Skin : no erythema, no ecchymosis\par o Sensation : sensation to pin intact\par o Vascular Exam : no edema, no cyanosis, dorsalis pedis artery pulse 2+, posterior tibial artery pulse 2+\par \par Left Lower Extremity\par o Knee :\par ¦ Inspection/Palpation : medial joint line tenderness to palpation, no swelling, no deformity\par ¦ Range of Motion : 0 -110 degrees, no crepitus\par ¦ Stability : no valgus or varus instability present on provocative testing, Lachman’s Test (-)\par ¦ Strength : flexion and extension 4/5\par o Muscle Bulk : normal muscle bulk present\par o Skin : no erythema, no ecchymosis\par o Sensation : sensation to pin intact\par o Vascular Exam : no edema, no cyanosis, dorsalis pedis artery pulse 2+, posterior tibial artery pulse 2+\par

## 2022-06-06 ENCOUNTER — APPOINTMENT (OUTPATIENT)
Dept: ORTHOPEDIC SURGERY | Facility: CLINIC | Age: 87
End: 2022-06-06
Payer: MEDICARE

## 2022-06-06 PROCEDURE — 20610 DRAIN/INJ JOINT/BURSA W/O US: CPT | Mod: 50

## 2022-06-06 NOTE — END OF VISIT
[FreeTextEntry3] : All medical record entries made by the [facility] were at my, Dr. Raheem Benavidez, direction and personally dictated by me on 06/06/2022. I have reviewed the chart and agree that the record accurately reflects my personal performance of the history, physical exam, assessment and plan. I have also personally directed, reviewed, and agreed with the chart.

## 2022-06-06 NOTE — HISTORY OF PRESENT ILLNESS
[6] : an average pain level of 6/10 [de-identified] : Patient is an 86 year-old female who presents for re-evaluation of bilateral knee pain. Left knee is bothering her more today. She has been diagnosed with mild to moderate medial compartment osteoarthritis of her left knee. She received a cortisone injections in the past, which were effective in alleviating her symptoms until recently. She notes her left knee pain has since returned and she describes a dull aching pain located diffusely about her bilateral knees that is exacerbated with prolonged periods of walking. She notes increased posterior left knee pain and fullness at times. She notes it also feels swollen. She has been utilizing CBD oil to manage her pain at this time. The patient reports she last had monovisc gel injections in November 2021, and would like to have Monovisc injections in B/L knees today.

## 2022-06-06 NOTE — REVIEW OF SYSTEMS
[Joint Pain] : joint pain [Negative] : Heme/Lymph [Joint Swelling] : joint swelling [FreeTextEntry9] : Bilateral Knee Pain

## 2022-06-06 NOTE — PHYSICAL EXAM
[Normal RLE] : Right Lower Extremity: No scars, rashes, lesions, ulcers, skin intact [Normal LLE] : Left Lower Extremity: No scars, rashes, lesions, ulcers, skin intact [Normal Touch] : sensation intact for touch [Normal] : Alert and in no acute distress [Poor Appearance] : well-appearing [Acute Distress] : not in acute distress [Obese] : not obese [de-identified] : \par Right Lower Extremity\par o Knee :\par ¦ Inspection/Palpation : medial joint line tenderness to palpation, no swelling, no deformity\par ¦ Range of Motion : 0 - 110 degrees, no crepitus\par ¦ Stability : no valgus or varus instability present on provocative testing, Lachman’s Test (-)\par ¦ Strength : flexion and extension 4/5\par o Muscle Bulk : normal muscle bulk present\par o Skin : no erythema, no ecchymosis\par o Sensation : sensation to pin intact\par o Vascular Exam : no edema, no cyanosis, dorsalis pedis artery pulse 2+, posterior tibial artery pulse 2+\par \par Left Lower Extremity\par o Knee :\par ¦ Inspection/Palpation : medial joint line tenderness to palpation, no swelling, no deformity\par ¦ Range of Motion : 0 -110 degrees, no crepitus\par ¦ Stability : no valgus or varus instability present on provocative testing, Lachman’s Test (-)\par ¦ Strength : flexion and extension 4/5\par o Muscle Bulk : normal muscle bulk present\par o Skin : no erythema, no ecchymosis\par o Sensation : sensation to pin intact\par o Vascular Exam : no edema, no cyanosis, dorsalis pedis artery pulse 2+, posterior tibial artery pulse 2+\par

## 2022-06-06 NOTE — DISCUSSION/SUMMARY
[de-identified] : The underlying pathophysiology was reviewed in great detail with the patient as well as the various treatment options, including ice, analgesics, NSAIDs, Physical therapy, steroid injections, hyaluronic gel injections, TKR \par \par The patient elected to receive a Monovisc injection into the left and right knee today and tolerated it well. I instructed the patient on ROM exercises, and told them to take it easy. The use of ice and rest was reviewed with the patient. The patient may resume activities in a couple of days. I reminded the patient that it takes 4 to 6 weeks after the injection to feel symptom relief.\par \par Activity modifications and restrictions were discussed. I advised avoiding deep bending, squatting and high intensity activity. \par \par Continue home exercise program. \par \par Patient may return in 6 months for another series of gel injections as per insurance guidelines. A cortisone injection may be administered during the interim period if he cannot tolerate such a wait. \par \par All questions were answered, all alternatives discussed and the patient is in complete agreement with that plan. Follow-up appointment as instructed. Any issues and the patient will call or come in sooner.

## 2022-06-06 NOTE — ADDENDUM
[FreeTextEntry1] : I, Casey Adhikari, acted solely as a scribe for Dr. Raheem Benavidez on this date 06/06/2022.

## 2022-12-06 NOTE — PROCEDURE
Anesthesia Evaluation     no history of anesthetic complications:  NPO Solid Status: > 8 hours  NPO Liquid Status: > 8 hours           Airway   Mallampati: II  TM distance: <3 FB  Neck ROM: full  No difficulty expected  Dental - normal exam     Pulmonary - negative pulmonary ROS and normal exam   (-) shortness of breath    ROS comment: snores  Cardiovascular - normal exam  Exercise tolerance: poor (<4 METS)    ECG reviewed  Patient on routine beta blocker and Beta blocker given within 24 hours of surgery    (+) hypertension, dysrhythmias Atrial Fib, hyperlipidemia,       Neuro/Psych  (+) psychiatric history Anxiety,    GI/Hepatic/Renal/Endo    (+) obesity,  GERD well controlled,  renal disease stones,     Musculoskeletal     (+) joint swelling (right knee),   Abdominal  - normal exam   Substance History      OB/GYN negative ob/gyn ROS         Other   arthritis,                      Anesthesia Plan    ASA 2     general     intravenous induction     Anesthetic plan, risks, benefits, and alternatives have been provided, discussed and informed consent has been obtained with: patient.    Use of blood products discussed with patient  Consented to blood products.       CODE STATUS:       
[de-identified] : At this point I recommended a therapeutic injection and under sterile precautions an injection of 4 cc of Monovisc (Lot:[id number], Expiration: 04/30/2024), was placed into the joint of the Right knee without complication		\par \par At this point I recommended a therapeutic injection and under sterile precautions an injection of 4 cc of Monovisc (Lot:[id number], Expiration: 04/30/2024), was placed into the joint of the Left knee without complication

## 2022-12-08 ENCOUNTER — APPOINTMENT (OUTPATIENT)
Dept: ORTHOPEDIC SURGERY | Facility: CLINIC | Age: 87
End: 2022-12-08

## 2022-12-08 VITALS — WEIGHT: 135 LBS | HEIGHT: 63 IN | BODY MASS INDEX: 23.92 KG/M2

## 2022-12-08 PROCEDURE — 73564 X-RAY EXAM KNEE 4 OR MORE: CPT | Mod: 50

## 2022-12-08 PROCEDURE — 99214 OFFICE O/P EST MOD 30 MIN: CPT | Mod: 25

## 2022-12-08 PROCEDURE — 20610 DRAIN/INJ JOINT/BURSA W/O US: CPT | Mod: 50

## 2023-06-15 ENCOUNTER — APPOINTMENT (OUTPATIENT)
Dept: ORTHOPEDIC SURGERY | Facility: CLINIC | Age: 88
End: 2023-06-15
Payer: MEDICARE

## 2023-06-15 PROCEDURE — 99214 OFFICE O/P EST MOD 30 MIN: CPT | Mod: 25

## 2023-06-15 PROCEDURE — 20610 DRAIN/INJ JOINT/BURSA W/O US: CPT | Mod: 50

## 2023-12-18 ENCOUNTER — APPOINTMENT (OUTPATIENT)
Dept: ORTHOPEDIC SURGERY | Facility: CLINIC | Age: 88
End: 2023-12-18
Payer: MEDICARE

## 2023-12-18 VITALS — WEIGHT: 138 LBS | BODY MASS INDEX: 24.45 KG/M2 | HEIGHT: 63 IN

## 2023-12-18 PROCEDURE — 20610 DRAIN/INJ JOINT/BURSA W/O US: CPT | Mod: 50

## 2023-12-18 PROCEDURE — 99214 OFFICE O/P EST MOD 30 MIN: CPT | Mod: 25

## 2023-12-18 NOTE — ADDENDUM
[FreeTextEntry1] : I, Dayan Rhodes, acted solely as a scribe for CHANDLER Gonzalez, on 12/18/2023.

## 2023-12-18 NOTE — PROCEDURE
[de-identified] : Procedure: Monovisc Injection Indication: Knee Osteoarthritis  At this point I recommended a therapeutic injection and under sterile precautions an injection of 4 cc of Monovisc (Lot:[id number], Expiration: 06/30/2026), was placed into the joint of the Right knee without complication		   At this point I recommended a therapeutic injection and under sterile precautions an injection of 4 cc of Monovisc (Lot:[id number], Expiration: 06/30/2026), was placed into the joint of the Left  knee without complication

## 2023-12-18 NOTE — REVIEW OF SYSTEMS
[Joint Pain] : joint pain [Joint Swelling] : joint swelling [Negative] : Heme/Lymph [FreeTextEntry9] : Bilateral Knee Pain

## 2023-12-18 NOTE — PHYSICAL EXAM
[Normal RLE] : Right Lower Extremity: No scars, rashes, lesions, ulcers, skin intact [Normal LLE] : Left Lower Extremity: No scars, rashes, lesions, ulcers, skin intact [Normal Touch] : sensation intact for touch [Normal] : Alert and in no acute distress [Poor Appearance] : well-appearing [Acute Distress] : not in acute distress [Obese] : not obese [de-identified] : Right Upper Extremity o Wrist:  Inspection/Palpation : no tenderness, or swelling,   Range of Motion : full and painless in all planes, no crepitus  Strength : extension, flexion, ulnar deviation and radial deviation 5/5  Stability : no joint instability on provocative testing  Tests/Signs : Tinel's sign (+ mild) over carpal tunnel o Muscle Bulk : no atrophy o Sensation : sensation intact to light touch o Skin : no skin lesions or discoloration o Vascular Exam : no edema or cyanosis, radial and ulnar pulses normal  Left Upper Extremity o Wrist:  Inspection/Palpation : no tenderness,or swelling   Range of Motion : full and painless in all planes, no crepitus  Strength : extension, flexion, ulnar deviation and radial deviation 5/5  Stability : no joint instability on provocative testing  Tests/Signs : Tinel's sign (-) over carpal tunnel o Muscle Bulk : no atrophy o Sensation : sensation intact to light touch o Skin : no skin lesions or discoloration o Vascular Exam : no edema or cyanosis, radial and ulnar pulses normal  Right Lower Extremity o Knee :  Inspection/Palpation : medial joint line tenderness to palpation, no swelling, no deformity  Range of Motion : 0 - 110 degrees, no crepitus  Stability : no valgus or varus instability present on provocative testing, Lachman's Test (-)  Strength : flexion and extension 4/5 o Muscle Bulk : normal muscle bulk present o Skin : no erythema, no ecchymosis o Sensation : sensation to pin intact o Vascular Exam : no edema, no cyanosis, dorsalis pedis artery pulse 2+, posterior tibial artery pulse 2+  Left Lower Extremity o Knee :  Inspection/Palpation : medial joint line tenderness to palpation, no swelling, no deformity  Range of Motion : 0 -110 degrees, no crepitus  Stability : no valgus or varus instability present on provocative testing, Lachman's Test (-)  Strength : flexion and extension 4/5 o Muscle Bulk : normal muscle bulk present o Skin : no erythema, no ecchymosis o Sensation : sensation to pin intact o Vascular Exam : no edema, no cyanosis, dorsalis pedis artery pulse 2+, posterior tibial artery pulse 2+  [de-identified] : \par  XR obtained on 12/08/22\par  o Right Knee : AP, lateral, sunrise, and Ha views of the knee were obtained, there are no soft tissue abnormalities, no fractures, alignment is normal, severe tricompartmental osteoarthritis with bone on bone apposition of the medial compartment, normal bone density, no bony lesions.\par  \par  o Left Knee : AP, lateral, sunrise, and Ha views of the knee were obtained, there are no soft tissue abnormalities, no fractures, alignment is normal, severe tricompartmental osteoarthritis with bone on bone apposition of the medial compartment, normal bone density, no bony lesions.\par  \par

## 2023-12-18 NOTE — DISCUSSION/SUMMARY
[de-identified] : The underlying pathophysiology was reviewed in great detail with the patient as well as the various treatment options, including ice, analgesics, NSAIDs, Physical therapy, steroid injections, hyaluronic gel injections, TKR   The patient elected to receive a Monovisc injection into the left and right knee today and tolerated it well. I instructed the patient on ROM exercises, and told them to take it easy. The use of ice and rest was reviewed with the patient. The patient may resume activities in a couple of days. I reminded the patient that it takes 4 to 6 weeks after the injection to feel symptom relief.  Activity modifications and restrictions were discussed. I advised avoiding deep bending, squatting and high intensity activity.   Continue home exercise program.   Patient may return in 6 months for another series of gel injections as per insurance guidelines and new x-ray. A cortisone injection may be administered during the interim period if he cannot tolerate such a wait.  We will get repeat bilateral knee xrays at her next visit.   A wrist brace was fit and provided in clinic today. Discussed use for support, immobilization and pain relief. Discussed use around the clock vs. at night.  Discussed possible CI . Discussed importance of following up with PCP and neurologist for EMG in April 2024.     All questions were answered, all alternatives discussed and the patient is in complete agreement with that plan. Follow-up appointment as instructed. Any issues and the patient will call or come in sooner.

## 2023-12-18 NOTE — HISTORY OF PRESENT ILLNESS
[6] : an average pain level of 6/10 [de-identified] : Patient is an 88 year-old female who presents for re-evaluation of bilateral knee pain. She notes pain intermittently for years.   she describes a dull aching pain located diffusely about her bilateral knees that is exacerbated with prolonged periods of walking.  Her   symptoms are exacerbated walking, standing from a seated position and with stairs. .  Pain is alleviated with rest.  Patient deniesinstability, catching or locking of the knee. She has been utilizing CBD oil to manage her pain at this time. The patient reports she last had monovisc gel injections in 12/8/2022 and 6/15/2023 and would like to have Monovisc injections in B/L knees today.  She also reports numbness and tingling in her right hand. She notes fingers 1-3 intermittently are affected. She saw her PCP who ordered a EMG which is scheduled for 04/2024.

## 2024-04-16 ENCOUNTER — APPOINTMENT (OUTPATIENT)
Dept: ORTHOPEDIC SURGERY | Facility: CLINIC | Age: 89
End: 2024-04-16
Payer: MEDICARE

## 2024-04-16 VITALS — WEIGHT: 130 LBS | HEIGHT: 63 IN | BODY MASS INDEX: 23.04 KG/M2

## 2024-04-16 DIAGNOSIS — M79.641 PAIN IN RIGHT HAND: ICD-10-CM

## 2024-04-16 DIAGNOSIS — G56.01 CARPAL TUNNEL SYNDROME, RIGHT UPPER LIMB: ICD-10-CM

## 2024-04-16 PROCEDURE — 20526 THER INJECTION CARP TUNNEL: CPT | Mod: RT

## 2024-04-16 PROCEDURE — 99203 OFFICE O/P NEW LOW 30 MIN: CPT | Mod: 25

## 2024-04-16 PROCEDURE — 99213 OFFICE O/P EST LOW 20 MIN: CPT | Mod: 25

## 2024-04-16 NOTE — END OF VISIT
[FreeTextEntry3] :  All medical record entries made by the Scribe were at my,  Dr. Xavier Ramos MD., direction and personally dictated by me on 04/16/2024. I have personally reviewed the chart and agree that the record accurately reflects my personal performance of the history, physical exam, assessment and plan.

## 2024-04-16 NOTE — PHYSICAL EXAM
[de-identified] : Patient is WDWN, alert, and in no acute distress. Breathing is unlabored. She is grossly oriented to person, place, and time.  Right Wrist:   No tenderness, edema, or deformities. Thenar atrophy is present. Full ROM with decreased sensation along median nerve distribution. Tests/Signs: Tinel's sign is positive over carpal tunnel, Phalen's test is positive.

## 2024-04-16 NOTE — HISTORY OF PRESENT ILLNESS
[FreeTextEntry1] : Patient is a 88-year-old, -hand-dominant male who presents to the office today for initial evaluation of right-hand pain. She complains of tingling on the right 1st 2nd and 3rd digits. She tried wrist brace which helped with the pain.   No bleeding, fever, chills, sweats, nausea or vomiting were endorsed at this visit. The above history is in addition to the intake form, which I personally reviewed at length, including the patient's medical, surgical, and family history. The patient's allergies were also carefully reviewed. In addition, the family and social history of the patient were also reviewed, which are non-contributory to this visit unless specified above. All of this has been documented accordingly in the visit note.

## 2024-04-16 NOTE — ADDENDUM
[FreeTextEntry1] :  I, Lewis Alatorre wrote this note acting as a scribe for Dr. Xavier Ramos on Apr 16, 2024.

## 2024-04-16 NOTE — DISCUSSION/SUMMARY
[FreeTextEntry1] :  The underlying pathophysiology was reviewed with the patient. XR films were reviewed with the patient. Discussed at length the nature of the patient's condition.   Patient was advised to take OTC medications and topical analgesic for pain management.  Patient was informed of the option of cortisone injection or surgery if the symptom is severe.   The patient wishes to proceed with a cortisone injection at this time. The skin was prepped with alcohol and sprayed with Ethyl Chloride. An injection of 0.5 cc 1% Lidocaine without epinephrine, 0.25 cc Kenalog 40 mg, and 0.25 cc Dexamethasone was administered into the Right carpal tunnel (#1). The patient tolerated procedure well. Apply ice.  All questions answered, understanding verbalized. Patient in agreement with plan of care.   Patient was advised to follow up as needed.

## 2024-05-13 ENCOUNTER — APPOINTMENT (OUTPATIENT)
Dept: NEUROLOGY | Facility: CLINIC | Age: 89
End: 2024-05-13

## 2024-07-01 ENCOUNTER — APPOINTMENT (OUTPATIENT)
Dept: ORTHOPEDIC SURGERY | Facility: CLINIC | Age: 89
End: 2024-07-01
Payer: MEDICARE

## 2024-07-01 VITALS — WEIGHT: 130 LBS | BODY MASS INDEX: 23.04 KG/M2 | HEIGHT: 63 IN

## 2024-07-01 DIAGNOSIS — M17.12 UNILATERAL PRIMARY OSTEOARTHRITIS, LEFT KNEE: ICD-10-CM

## 2024-07-01 DIAGNOSIS — M17.11 UNILATERAL PRIMARY OSTEOARTHRITIS, RIGHT KNEE: ICD-10-CM

## 2024-07-01 PROCEDURE — 99214 OFFICE O/P EST MOD 30 MIN: CPT | Mod: 25

## 2024-07-01 PROCEDURE — 20610 DRAIN/INJ JOINT/BURSA W/O US: CPT | Mod: 50

## 2024-07-01 PROCEDURE — 73564 X-RAY EXAM KNEE 4 OR MORE: CPT | Mod: 50

## 2024-10-01 ENCOUNTER — APPOINTMENT (OUTPATIENT)
Dept: ORTHOPEDIC SURGERY | Facility: CLINIC | Age: 89
End: 2024-10-01
Payer: MEDICARE

## 2024-10-01 VITALS — BODY MASS INDEX: 24.8 KG/M2 | WEIGHT: 140 LBS | HEIGHT: 63 IN

## 2024-10-01 DIAGNOSIS — G56.01 CARPAL TUNNEL SYNDROME, RIGHT UPPER LIMB: ICD-10-CM

## 2024-10-01 PROCEDURE — 99213 OFFICE O/P EST LOW 20 MIN: CPT

## 2024-10-01 NOTE — HISTORY OF PRESENT ILLNESS
[FreeTextEntry1] : Patient is a 88-year-old, -hand-dominant male who presents to the office today for initial evaluation of right-hand pain. She complains of tingling on the right 1st 2nd and 3rd digits. She tried wrist brace which helped with the pain.   Today, Oct 01, 2024, the patient presents for follow-up and further management. The patient continues to have pain pertaining to her right hand. The patient reports no significant changes to their symptoms since their last visit. She would like to discuss surgery for her right hand.

## 2024-10-01 NOTE — DISCUSSION/SUMMARY
[FreeTextEntry1] : The underlying pathophysiology was reviewed with the patient. XR films were reviewed with the patient. Discussed at length the nature of the patients condition. Their right hand symptoms appear secondary to carpel tunnel syndrome. Treatment options were discussed including; surgical intervention.  The patient wishes to proceed with right carpel tunnel release at this time. The risks and benefits were reviewed with the patient. All of her questions were answered. She will meet with our surgical scheduler.  All questions answered, understanding verbalized. Patient in agreement with plan of care.

## 2024-10-01 NOTE — ADDENDUM
[FreeTextEntry1] : I, Catrachito Delaney Jr, acted solely as a scribe for Dr. Xavier Ramos on this date 10/01/2024  All medical record entries made by the Scribe were at my, Dr. Xavier Ramos, direction and personally dictated by me on 10/01/2024 . I have reviewed the chart and agree that the record accurately reflects my personal performance of the history, physical exam, assessment and plan. I have also personally directed, reviewed, and agreed with the chart.

## 2024-10-01 NOTE — PHYSICAL EXAM
[de-identified] : Patient is WDWN, alert, and in no acute distress. Breathing is unlabored. She is grossly oriented to person, place, and time.  Right Wrist:  No tenderness, edema, or deformities. Thenar atrophy is present. Full ROM with decreased sensation along median nerve distribution. Tests/Signs: Tinel's sign is positive over carpal tunnel, Phalen's test is positive.

## 2024-10-14 ENCOUNTER — OUTPATIENT (OUTPATIENT)
Dept: OUTPATIENT SERVICES | Facility: HOSPITAL | Age: 89
LOS: 1 days | End: 2024-10-14
Payer: MEDICARE

## 2024-10-14 VITALS
TEMPERATURE: 98 F | DIASTOLIC BLOOD PRESSURE: 62 MMHG | HEIGHT: 63.5 IN | WEIGHT: 138.01 LBS | SYSTOLIC BLOOD PRESSURE: 107 MMHG | HEART RATE: 64 BPM | OXYGEN SATURATION: 98 % | RESPIRATION RATE: 18 BRPM

## 2024-10-14 DIAGNOSIS — M79.641 PAIN IN RIGHT HAND: Chronic | ICD-10-CM

## 2024-10-14 DIAGNOSIS — Z90.710 ACQUIRED ABSENCE OF BOTH CERVIX AND UTERUS: Chronic | ICD-10-CM

## 2024-10-14 DIAGNOSIS — G56.01 CARPAL TUNNEL SYNDROME, RIGHT UPPER LIMB: ICD-10-CM

## 2024-10-14 DIAGNOSIS — Z87.81 PERSONAL HISTORY OF (HEALED) TRAUMATIC FRACTURE: Chronic | ICD-10-CM

## 2024-10-14 DIAGNOSIS — Z01.818 ENCOUNTER FOR OTHER PREPROCEDURAL EXAMINATION: ICD-10-CM

## 2024-10-14 LAB
ALBUMIN SERPL ELPH-MCNC: 3.9 G/DL — SIGNIFICANT CHANGE UP (ref 3.3–5)
ALP SERPL-CCNC: 51 U/L — SIGNIFICANT CHANGE UP (ref 30–120)
ALT FLD-CCNC: 19 U/L — SIGNIFICANT CHANGE UP (ref 10–60)
ANION GAP SERPL CALC-SCNC: 7 MMOL/L — SIGNIFICANT CHANGE UP (ref 5–17)
AST SERPL-CCNC: 18 U/L — SIGNIFICANT CHANGE UP (ref 10–40)
BILIRUB SERPL-MCNC: 0.5 MG/DL — SIGNIFICANT CHANGE UP (ref 0.2–1.2)
BUN SERPL-MCNC: 25 MG/DL — HIGH (ref 7–23)
CALCIUM SERPL-MCNC: 10.2 MG/DL — SIGNIFICANT CHANGE UP (ref 8.4–10.5)
CHLORIDE SERPL-SCNC: 101 MMOL/L — SIGNIFICANT CHANGE UP (ref 96–108)
CO2 SERPL-SCNC: 29 MMOL/L — SIGNIFICANT CHANGE UP (ref 22–31)
CREAT SERPL-MCNC: 0.89 MG/DL — SIGNIFICANT CHANGE UP (ref 0.5–1.3)
EGFR: 63 ML/MIN/1.73M2 — SIGNIFICANT CHANGE UP
GLUCOSE SERPL-MCNC: 103 MG/DL — HIGH (ref 70–99)
HCT VFR BLD CALC: 40.4 % — SIGNIFICANT CHANGE UP (ref 34.5–45)
HGB BLD-MCNC: 14 G/DL — SIGNIFICANT CHANGE UP (ref 11.5–15.5)
MCHC RBC-ENTMCNC: 30.1 PG — SIGNIFICANT CHANGE UP (ref 27–34)
MCHC RBC-ENTMCNC: 34.7 G/DL — SIGNIFICANT CHANGE UP (ref 32–36)
MCV RBC AUTO: 86.9 FL — SIGNIFICANT CHANGE UP (ref 80–100)
NRBC # BLD: 0 /100 WBCS — SIGNIFICANT CHANGE UP (ref 0–0)
PLATELET # BLD AUTO: 269 K/UL — SIGNIFICANT CHANGE UP (ref 150–400)
POTASSIUM SERPL-MCNC: 3.9 MMOL/L — SIGNIFICANT CHANGE UP (ref 3.5–5.3)
POTASSIUM SERPL-SCNC: 3.9 MMOL/L — SIGNIFICANT CHANGE UP (ref 3.5–5.3)
PROT SERPL-MCNC: 6.8 G/DL — SIGNIFICANT CHANGE UP (ref 6–8.3)
RBC # BLD: 4.65 M/UL — SIGNIFICANT CHANGE UP (ref 3.8–5.2)
RBC # FLD: 13.6 % — SIGNIFICANT CHANGE UP (ref 10.3–14.5)
SODIUM SERPL-SCNC: 137 MMOL/L — SIGNIFICANT CHANGE UP (ref 135–145)
WBC # BLD: 12.36 K/UL — HIGH (ref 3.8–10.5)
WBC # FLD AUTO: 12.36 K/UL — HIGH (ref 3.8–10.5)

## 2024-10-14 PROCEDURE — 93010 ELECTROCARDIOGRAM REPORT: CPT

## 2024-10-14 NOTE — H&P PST ADULT - NSICDXPASTMEDICALHX_GEN_ALL_CORE_FT
PAST MEDICAL HISTORY:  Acquired trigger finger of left ring finger     Heart murmur     Hypercholesteremia     Hypertension     Osteoarthritis     Right carpal tunnel syndrome

## 2024-10-14 NOTE — H&P PST ADULT - HISTORY OF PRESENT ILLNESS
88 y/o female present with Right hand pain.She complains of tingling on the right 1st 2nd and 3rd digits. She tried wrist brace which helped with the pain. She had steroid injection with some relief. Patient is scheduled for Right carpal tunnel release on 11/01/2024  ?

## 2024-10-14 NOTE — H&P PST ADULT - NSICDXPROCEDURE_GEN_ALL_CORE_FT
PROCEDURES:  Open release of right carpal tunnel 14-Oct-2024 14:00:29 left ring finger steroid injection Etelvina Poole

## 2024-10-14 NOTE — H&P PST ADULT - ASSESSMENT
88 y/o female with right carpal tunnel release   Patient c/o Right hand  pain  scheduled surgery: Right  carpal tunnel release  will obtain medical clearance  pre-op instructions provided  Instructions provided on medications to continue and to take the day morning of surgery

## 2024-10-14 NOTE — H&P PST ADULT - NSICDXNOFAMILYHX_GEN_ALL_CORE
- continue home gabapentin and norco  - unable to walk without total assist  - PT/OT     <-- Click to add NO pertinent Family History

## 2024-11-01 ENCOUNTER — TRANSCRIPTION ENCOUNTER (OUTPATIENT)
Age: 89
End: 2024-11-01

## 2024-11-01 ENCOUNTER — APPOINTMENT (OUTPATIENT)
Dept: ORTHOPEDIC SURGERY | Facility: HOSPITAL | Age: 89
End: 2024-11-01

## 2024-11-01 ENCOUNTER — OUTPATIENT (OUTPATIENT)
Dept: OUTPATIENT SERVICES | Facility: HOSPITAL | Age: 89
LOS: 1 days | End: 2024-11-01
Payer: MEDICARE

## 2024-11-01 VITALS
HEART RATE: 63 BPM | TEMPERATURE: 98 F | HEIGHT: 63 IN | OXYGEN SATURATION: 98 % | SYSTOLIC BLOOD PRESSURE: 149 MMHG | RESPIRATION RATE: 14 BRPM | WEIGHT: 140.43 LBS | DIASTOLIC BLOOD PRESSURE: 60 MMHG

## 2024-11-01 VITALS
HEART RATE: 60 BPM | RESPIRATION RATE: 12 BRPM | SYSTOLIC BLOOD PRESSURE: 137 MMHG | OXYGEN SATURATION: 99 % | DIASTOLIC BLOOD PRESSURE: 66 MMHG

## 2024-11-01 DIAGNOSIS — M79.641 PAIN IN RIGHT HAND: Chronic | ICD-10-CM

## 2024-11-01 DIAGNOSIS — Z87.81 PERSONAL HISTORY OF (HEALED) TRAUMATIC FRACTURE: Chronic | ICD-10-CM

## 2024-11-01 DIAGNOSIS — Z90.710 ACQUIRED ABSENCE OF BOTH CERVIX AND UTERUS: Chronic | ICD-10-CM

## 2024-11-01 DIAGNOSIS — G56.01 CARPAL TUNNEL SYNDROME, RIGHT UPPER LIMB: ICD-10-CM

## 2024-11-01 PROBLEM — E78.00 PURE HYPERCHOLESTEROLEMIA, UNSPECIFIED: Chronic | Status: ACTIVE | Noted: 2024-10-14

## 2024-11-01 PROBLEM — R01.1 CARDIAC MURMUR, UNSPECIFIED: Chronic | Status: ACTIVE | Noted: 2024-10-14

## 2024-11-01 PROBLEM — M19.90 UNSPECIFIED OSTEOARTHRITIS, UNSPECIFIED SITE: Chronic | Status: ACTIVE | Noted: 2024-10-14

## 2024-11-01 PROCEDURE — 64721 CARPAL TUNNEL SURGERY: CPT | Mod: RT

## 2024-11-01 PROCEDURE — 26055 INCISE FINGER TENDON SHEATH: CPT | Mod: F3

## 2024-11-01 RX ORDER — OMEGA-3-ACID ETHYL ESTERS 1 G/1
1 CAPSULE, LIQUID FILLED ORAL
Refills: 0 | DISCHARGE

## 2024-11-01 RX ORDER — AMLODIPINE BESYLATE 5 MG
1 TABLET ORAL
Refills: 0 | DISCHARGE

## 2024-11-01 RX ORDER — METOPROLOL TARTRATE 50 MG
1 TABLET ORAL
Refills: 0 | DISCHARGE

## 2024-11-01 RX ORDER — IBUPROFEN 200 MG
1 TABLET ORAL
Qty: 30 | Refills: 0
Start: 2024-11-01

## 2024-11-01 RX ORDER — ASPIRIN 325 MG
0 TABLET ORAL
Refills: 0 | DISCHARGE

## 2024-11-01 RX ORDER — APREPITANT 40 MG/1
40 CAPSULE ORAL ONCE
Refills: 0 | Status: COMPLETED | OUTPATIENT
Start: 2024-11-01 | End: 2024-11-01

## 2024-11-01 RX ORDER — OXYCODONE HYDROCHLORIDE 30 MG/1
5 TABLET ORAL ONCE
Refills: 0 | Status: DISCONTINUED | OUTPATIENT
Start: 2024-11-01 | End: 2024-11-01

## 2024-11-01 RX ORDER — SIMVASTATIN 20 MG
1 TABLET ORAL
Refills: 0 | DISCHARGE

## 2024-11-01 RX ORDER — ONDANSETRON HYDROCHLORIDE 2 MG/ML
4 INJECTION, SOLUTION INTRAMUSCULAR; INTRAVENOUS ONCE
Refills: 0 | Status: DISCONTINUED | OUTPATIENT
Start: 2024-11-01 | End: 2024-11-01

## 2024-11-01 RX ORDER — CHLORHEXIDINE GLUCONATE 40 MG/ML
1 SOLUTION TOPICAL ONCE
Refills: 0 | Status: COMPLETED | OUTPATIENT
Start: 2024-11-01 | End: 2024-11-01

## 2024-11-01 RX ORDER — HYDROMORPHONE HCL/0.9% NACL/PF 6 MG/30 ML
0.5 PATIENT CONTROLLED ANALGESIA SYRINGE INTRAVENOUS
Refills: 0 | Status: DISCONTINUED | OUTPATIENT
Start: 2024-11-01 | End: 2024-11-01

## 2024-11-01 RX ORDER — HYDROCHLOROTHIAZIDE 50 MG/1
1 TABLET ORAL
Refills: 0 | DISCHARGE

## 2024-11-01 RX ORDER — OXYCODONE AND ACETAMINOPHEN 7.5; 325 MG/1; MG/1
1 TABLET ORAL
Qty: 5 | Refills: 0
Start: 2024-11-01

## 2024-11-01 RX ORDER — LOSARTAN POTASSIUM 100 MG/1
1 TABLET, FILM COATED ORAL
Refills: 0 | DISCHARGE

## 2024-11-01 RX ORDER — CEFAZOLIN SODIUM 1 G
2000 VIAL (EA) INJECTION ONCE
Refills: 0 | Status: COMPLETED | OUTPATIENT
Start: 2024-11-01 | End: 2024-11-01

## 2024-11-01 RX ADMIN — APREPITANT 40 MILLIGRAM(S): 40 CAPSULE ORAL at 08:41

## 2024-11-01 RX ADMIN — CHLORHEXIDINE GLUCONATE 1 APPLICATION(S): 40 SOLUTION TOPICAL at 08:41

## 2024-11-01 RX ADMIN — Medication 100 MILLILITER(S): at 11:40

## 2024-11-01 NOTE — ASU DISCHARGE PLAN (ADULT/PEDIATRIC) - CARE PROVIDER_API CALL
Xavier Ramos  Orthopaedic Surgery  [address], [address]  [place] [address]  Phone: [phone]  Fax: [phone]  Follow Up Time:

## 2024-11-01 NOTE — ASU PATIENT PROFILE, ADULT - PATIENT REPRESENTATIVE NAME
Gifty Casey Minoxidil Counseling: Minoxidil is a topical medication which can increase blood flow where it is applied. It is uncertain how this medication increases hair growth. Side effects are uncommon and include stinging and allergic reactions.

## 2024-11-01 NOTE — ASU DISCHARGE PLAN (ADULT/PEDIATRIC) - ASU DC SPECIAL INSTRUCTIONSFT
Elevate  Right  arm in sling daily when up & walking.  Elevate the  hand/arm above heart level on pillow/blankets when lying down.    Apply ice packs to top of Right  hand for 20 min on and off  Keep bandage clean, dry , & intact until seen in office  May open & close the fingers of the operated arm every hour for exercise.  Call the Dr.  for fever, severe pain, fall or hand injury.  Call for an appointment for office visit  in 7- 10 days.

## 2024-11-01 NOTE — BRIEF OPERATIVE NOTE - NSICDXBRIEFPREOP_GEN_ALL_CORE_FT
PRE-OP DIAGNOSIS:  Carpal tunnel syndrome 01-Nov-2024 10:48:14  Elvira Soria  Left trigger finger 01-Nov-2024 10:48:55  Elvira Soria

## 2024-11-01 NOTE — ASU DISCHARGE PLAN (ADULT/PEDIATRIC) - FINANCIAL ASSISTANCE
[facility] provides services at a reduced cost to those who are determined to be eligible through [facility]’s financial assistance program. Information regarding [facility]’s financial assistance program can be found by going to [email or web address] or by calling [phone].

## 2024-11-01 NOTE — BRIEF OPERATIVE NOTE - NSICDXBRIEFPOSTOP_GEN_ALL_CORE_FT
POST-OP DIAGNOSIS:  Carpal tunnel syndrome 01-Nov-2024 10:49:11  Elvira Soria  Left trigger finger 01-Nov-2024 10:49:23  Elvira Soria

## 2024-11-01 NOTE — BRIEF OPERATIVE NOTE - NSICDXBRIEFPROCEDURE_GEN_ALL_CORE_FT
PROCEDURES:  Open release of right carpal tunnel 01-Nov-2024 10:47:27  Elvira Soria  Injection, steroid, finger 01-Nov-2024 10:48:00  Elvira Soria

## 2024-11-12 ENCOUNTER — APPOINTMENT (OUTPATIENT)
Dept: ORTHOPEDIC SURGERY | Facility: CLINIC | Age: 89
End: 2024-11-12
Payer: MEDICARE

## 2024-11-12 VITALS — HEIGHT: 63 IN | BODY MASS INDEX: 24.8 KG/M2 | WEIGHT: 140 LBS

## 2024-11-12 DIAGNOSIS — G56.01 CARPAL TUNNEL SYNDROME, RIGHT UPPER LIMB: ICD-10-CM

## 2024-11-12 PROCEDURE — 99024 POSTOP FOLLOW-UP VISIT: CPT

## 2024-11-20 PROCEDURE — 36415 COLL VENOUS BLD VENIPUNCTURE: CPT

## 2024-11-20 PROCEDURE — 20550 NJX 1 TENDON SHEATH/LIGAMENT: CPT | Mod: F3

## 2024-11-20 PROCEDURE — 85027 COMPLETE CBC AUTOMATED: CPT

## 2024-11-20 PROCEDURE — 93005 ELECTROCARDIOGRAM TRACING: CPT

## 2024-11-20 PROCEDURE — 80053 COMPREHEN METABOLIC PANEL: CPT

## 2024-11-20 PROCEDURE — G0463: CPT

## 2024-11-20 PROCEDURE — 64721 CARPAL TUNNEL SURGERY: CPT | Mod: RT

## 2025-01-06 ENCOUNTER — APPOINTMENT (OUTPATIENT)
Dept: ORTHOPEDIC SURGERY | Facility: CLINIC | Age: 89
End: 2025-01-06
Payer: MEDICARE

## 2025-01-06 VITALS — HEIGHT: 63 IN | WEIGHT: 140 LBS | BODY MASS INDEX: 24.8 KG/M2

## 2025-01-06 DIAGNOSIS — M17.11 UNILATERAL PRIMARY OSTEOARTHRITIS, RIGHT KNEE: ICD-10-CM

## 2025-01-06 DIAGNOSIS — M17.12 UNILATERAL PRIMARY OSTEOARTHRITIS, LEFT KNEE: ICD-10-CM

## 2025-01-06 PROCEDURE — 20610 DRAIN/INJ JOINT/BURSA W/O US: CPT | Mod: 50,79

## 2025-01-06 PROCEDURE — 99213 OFFICE O/P EST LOW 20 MIN: CPT | Mod: 24,25

## 2025-01-06 PROCEDURE — 73564 X-RAY EXAM KNEE 4 OR MORE: CPT | Mod: 50

## 2025-07-07 ENCOUNTER — APPOINTMENT (OUTPATIENT)
Dept: ORTHOPEDIC SURGERY | Facility: CLINIC | Age: 89
End: 2025-07-07
Payer: MEDICARE

## 2025-07-07 VITALS — HEIGHT: 63 IN | BODY MASS INDEX: 24.8 KG/M2 | WEIGHT: 140 LBS

## 2025-07-07 PROCEDURE — 20610 DRAIN/INJ JOINT/BURSA W/O US: CPT | Mod: 50

## 2025-07-07 PROCEDURE — 99213 OFFICE O/P EST LOW 20 MIN: CPT | Mod: 25

## 2025-07-22 ENCOUNTER — APPOINTMENT (OUTPATIENT)
Dept: ORTHOPEDIC SURGERY | Facility: CLINIC | Age: 89
End: 2025-07-22
Payer: MEDICARE

## 2025-07-22 DIAGNOSIS — M65.331 TRIGGER FINGER, RIGHT MIDDLE FINGER: ICD-10-CM

## 2025-07-22 DIAGNOSIS — M65.341 TRIGGER FINGER, RIGHT RING FINGER: ICD-10-CM

## 2025-07-22 PROCEDURE — 20550 NJX 1 TENDON SHEATH/LIGAMENT: CPT

## 2025-07-22 PROCEDURE — 99213 OFFICE O/P EST LOW 20 MIN: CPT | Mod: 25

## (undated) DEVICE — DRSG ACE BANDAGE 2"

## (undated) DEVICE — TOURNIQUET ESMARK 4"

## (undated) DEVICE — PREP SCRUB SKIN EXIDINE4% 30OZ

## (undated) DEVICE — PACK UPPER EXTREMITY

## (undated) DEVICE — POSITIONER FOAM HEAD CRADLE (PINK)

## (undated) DEVICE — DRAPE TOWEL BLUE 17" X 24"

## (undated) DEVICE — SUT MONOSOF 5-0 18" P-13

## (undated) DEVICE — GLV 8 PROTEXIS (BLUE)

## (undated) DEVICE — SLING ARM CHIEFTAIN MESH LARGE

## (undated) DEVICE — GLV 7 PROTEXIS (WHITE)

## (undated) DEVICE — PREP CHLOROHEXIDINE 4% 118CC KIT

## (undated) DEVICE — VENODYNE/SCD SLEEVE CALF MEDIUM

## (undated) DEVICE — GLV 7.5 PROTEXIS (WHITE)

## (undated) DEVICE — TOURNIQUET CUFF 18" DUAL PORT DUAL BLADDER W PLC (BLACK)

## (undated) DEVICE — POSITIONER STRAP ARMBOARD VELCRO TS-30

## (undated) DEVICE — WARMING BLANKET LOWER ADULT

## (undated) DEVICE — DRAPE 3/4 SHEET 52X76"